# Patient Record
Sex: FEMALE | Race: WHITE | NOT HISPANIC OR LATINO | Employment: UNEMPLOYED | URBAN - METROPOLITAN AREA
[De-identification: names, ages, dates, MRNs, and addresses within clinical notes are randomized per-mention and may not be internally consistent; named-entity substitution may affect disease eponyms.]

---

## 2017-08-16 ENCOUNTER — HOSPITAL ENCOUNTER (EMERGENCY)
Facility: OTHER | Age: 47
Discharge: HOME | End: 2017-08-16
Attending: EMERGENCY MEDICINE
Payer: MEDICARE

## 2017-08-16 VITALS
BODY MASS INDEX: 26.68 KG/M2 | DIASTOLIC BLOOD PRESSURE: 55 MMHG | HEIGHT: 67 IN | WEIGHT: 170 LBS | TEMPERATURE: 98 F | RESPIRATION RATE: 18 BRPM | OXYGEN SATURATION: 98 % | SYSTOLIC BLOOD PRESSURE: 105 MMHG | HEART RATE: 92 BPM

## 2017-08-16 DIAGNOSIS — R11.2 NON-INTRACTABLE VOMITING WITH NAUSEA, UNSPECIFIED VOMITING TYPE: Primary | ICD-10-CM

## 2017-08-16 LAB
ALBUMIN SERPL BCP-MCNC: 3.6 G/DL
ALP SERPL-CCNC: 81 U/L
ALT SERPL W/O P-5'-P-CCNC: 30 U/L
ANION GAP SERPL CALC-SCNC: 11 MMOL/L
AST SERPL-CCNC: 24 U/L
BASOPHILS # BLD AUTO: 0 K/UL
BASOPHILS NFR BLD: 0 %
BILIRUB SERPL-MCNC: 0.3 MG/DL
BUN SERPL-MCNC: 16 MG/DL
CALCIUM SERPL-MCNC: 8.9 MG/DL
CHLORIDE SERPL-SCNC: 105 MMOL/L
CO2 SERPL-SCNC: 22 MMOL/L
CREAT SERPL-MCNC: 0.9 MG/DL
DIFFERENTIAL METHOD: ABNORMAL
EOSINOPHIL # BLD AUTO: 0 K/UL
EOSINOPHIL NFR BLD: 0.3 %
ERYTHROCYTE [DISTWIDTH] IN BLOOD BY AUTOMATED COUNT: 13 %
EST. GFR  (AFRICAN AMERICAN): >60 ML/MIN/1.73 M^2
EST. GFR  (NON AFRICAN AMERICAN): >60 ML/MIN/1.73 M^2
GLUCOSE SERPL-MCNC: 113 MG/DL
HCT VFR BLD AUTO: 38.6 %
HGB BLD-MCNC: 13 G/DL
LIPASE SERPL-CCNC: 21 U/L
LYMPHOCYTES # BLD AUTO: 0.5 K/UL
LYMPHOCYTES NFR BLD: 4.9 %
MCH RBC QN AUTO: 33.4 PG
MCHC RBC AUTO-ENTMCNC: 33.7 G/DL
MCV RBC AUTO: 99 FL
MONOCYTES # BLD AUTO: 0.6 K/UL
MONOCYTES NFR BLD: 6.9 %
NEUTROPHILS # BLD AUTO: 8 K/UL
NEUTROPHILS NFR BLD: 87.7 %
PLATELET # BLD AUTO: 282 K/UL
PMV BLD AUTO: 9.9 FL
POTASSIUM SERPL-SCNC: 3.6 MMOL/L
PROT SERPL-MCNC: 6.5 G/DL
RBC # BLD AUTO: 3.89 M/UL
SODIUM SERPL-SCNC: 138 MMOL/L
WBC # BLD AUTO: 9.1 K/UL

## 2017-08-16 PROCEDURE — 83690 ASSAY OF LIPASE: CPT

## 2017-08-16 PROCEDURE — 80053 COMPREHEN METABOLIC PANEL: CPT

## 2017-08-16 PROCEDURE — 85025 COMPLETE CBC W/AUTO DIFF WBC: CPT

## 2017-08-16 PROCEDURE — 99284 EMERGENCY DEPT VISIT MOD MDM: CPT | Mod: 25

## 2017-08-16 PROCEDURE — 96360 HYDRATION IV INFUSION INIT: CPT

## 2017-08-16 PROCEDURE — 25000003 PHARM REV CODE 250: Performed by: EMERGENCY MEDICINE

## 2017-08-16 RX ORDER — PANTOPRAZOLE SODIUM 40 MG/1
40 TABLET, DELAYED RELEASE ORAL 2 TIMES DAILY
COMMUNITY

## 2017-08-16 RX ORDER — METOPROLOL SUCCINATE 50 MG/1
50 TABLET, EXTENDED RELEASE ORAL DAILY
COMMUNITY

## 2017-08-16 RX ORDER — CYCLOBENZAPRINE HCL 10 MG
10 TABLET ORAL 3 TIMES DAILY PRN
COMMUNITY

## 2017-08-16 RX ORDER — TRANDOLAPRIL 1 MG/1
1 TABLET ORAL DAILY
COMMUNITY

## 2017-08-16 RX ORDER — CLONAZEPAM 1 MG/1
1 TABLET ORAL 2 TIMES DAILY PRN
COMMUNITY

## 2017-08-16 RX ORDER — CITALOPRAM 40 MG/1
40 TABLET, FILM COATED ORAL DAILY
COMMUNITY

## 2017-08-16 RX ORDER — PROMETHAZINE HYDROCHLORIDE 25 MG/1
25 TABLET ORAL EVERY 4 HOURS
COMMUNITY

## 2017-08-16 RX ORDER — CLOPIDOGREL BISULFATE 75 MG/1
75 TABLET ORAL DAILY
COMMUNITY

## 2017-08-16 RX ADMIN — SODIUM CHLORIDE 1000 ML: 0.9 INJECTION, SOLUTION INTRAVENOUS at 07:08

## 2017-08-16 NOTE — ED NOTES
Bed: [place] [address]  Expected date:   Expected time:   Means of arrival:   Comments:  43 y f n/v

## 2017-08-17 NOTE — ED TRIAGE NOTES
Pt presents to ED via EMS with c/o multiple episodes of vomiting that started today, reports unrelieved with 25 mg phenergan tablets. Pt denies n/v at this time after given 4 mg of Zofran by EMS. Pt reports she was walking outside in the heat the past few days. Pt c/o abdominal pain from multiple episode of vomiting. Pt AAO x4.

## 2017-08-17 NOTE — ED PROVIDER NOTES
Encounter Date: 8/16/2017    SCRIBE #1 NOTE: IMadiha am scribing for, and in the presence of,  Dr. Crocker. I have scribed the following portions of the note - Other sections scribed: HPI.   SCRIBE #2 NOTE: Brunilda GIBBS, gomez scribing for, and in the presence of,  Dr. Crocker. I have scribed the remaining portions of the note not scribed by Scribe #1.     History     Chief Complaint   Patient presents with    Vomiting     reports n/v and generalized abd pain since beginning vomiting 4 hrs ago. pt reports attempted to take phenergen 25 mg x 4 without success due to vomiting.     Abdominal Pain     Time seen by provider: 7:49 PM    This is a 46 y.o. 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  The patient also endorses abdominal pain described as upper abdomen soreness from repeated retching.  She admits to eating a large breakfast, beignets, and a hot dog today, then developed symptoms after spending a significant amount of time in the heat. Pt has episodic emesis from multiple complications from mesh surgery years ago, with chronic abdominal pain, no worsening now. She denies fever, chills, SOB, CP, palpitations, leg swelling, blood in stool, constipation, or diarrhea.  Although she attempted to alleviate her discomfort with phenergan, she vomited multiple attempts to take pill.  The patient reports no other identifying, alleviating, or exacerbating factors, no sick contacts or known food poisoning. She also mentions atraumatic abnormal bruising to her bilateral lower extremities, she states that this has persisted for approximately 2 weeks. She admits that she takes Plavix daily and NSAIDs PRN for chronic pain.       The history is provided by the patient.     Review of patient's allergies indicates:   Allergen Reactions    Amitriptyline      "Bactrim [sulfamethoxazole-trimethoprim]     Ciprofloxacin     Pcn [penicillins]      Past Medical History:   Diagnosis Date    Abdominal mass     HTN (hypertension)     Skin cancer     Stomach ulcer      Past Surgical History:   Procedure Laterality Date    BLADDER SURGERY      CHOLECYSTECTOMY      HYSTERECTOMY      TONSILLECTOMY       History reviewed. No pertinent family history.  Social History   Substance Use Topics    Smoking status: Former Smoker    Smokeless tobacco: Never Used    Alcohol use No     Review of Systems   Constitutional: Negative for chills and fever.   HENT: Negative for facial swelling.    Respiratory: Negative for shortness of breath.    Cardiovascular: Negative for chest pain, palpitations and leg swelling.   Gastrointestinal: Positive for abdominal pain, nausea and vomiting. Negative for blood in stool, constipation and diarrhea.   Endocrine: Negative for polyuria.   Genitourinary: Negative for dysuria.   Musculoskeletal: Negative for myalgias.   Skin: Negative for rash.        Positive for "knots" in the right posterior and left lateral calves bilaterally.   Neurological: Negative for headaches.   Hematological: Bruises/bleeds easily.       Physical Exam     Initial Vitals [08/16/17 1836]   BP Pulse Resp Temp SpO2   (!) 121/57 98 18 98 °F (36.7 °C) 97 %      MAP       78.33         Physical Exam    Nursing note and vitals reviewed.  Constitutional: She appears well-developed and well-nourished. She is not diaphoretic. No distress.   HENT:   Head: Normocephalic and atraumatic.   Right Ear: External ear normal.   Left Ear: External ear normal.   Mouth/Throat: Mucous membranes are dry.   Eyes: EOM are normal. Right eye exhibits no discharge. Left eye exhibits no discharge.   Neck: Normal range of motion.   Cardiovascular: Normal rate, regular rhythm and normal heart sounds. Exam reveals no gallop and no friction rub.    No murmur heard.  Pulmonary/Chest: Breath sounds normal. No " respiratory distress. She has no wheezes. She has no rhonchi. She has no rales.   Abdominal: Soft. There is tenderness. There is no rebound and no guarding.   Non-focal mild upper abdominal tenderness to palpation.   Musculoskeletal: Normal range of motion. She exhibits no edema or tenderness.   R calf superficial area ecchymosis, no distal edema/erythema.  Few other small areas ecchymosis over extremities with no sign underlying fracture   Neurological: She is alert and oriented to person, place, and time.   Skin: Skin is warm and dry. No rash and no abscess noted. No erythema. No pallor.   Psychiatric: She has a normal mood and affect. Her behavior is normal. Judgment and thought content normal.         ED Course   Procedures  Labs Reviewed   CBC W/ AUTO DIFFERENTIAL - Abnormal; Notable for the following:        Result Value    RBC 3.89 (*)     MCV 99 (*)     MCH 33.4 (*)     Gran # 8.0 (*)     Lymph # 0.5 (*)     Gran% 87.7 (*)     Lymph% 4.9 (*)     All other components within normal limits   COMPREHENSIVE METABOLIC PANEL - Abnormal; Notable for the following:     CO2 22 (*)     Glucose 113 (*)     All other components within normal limits   LIPASE     Imaging Results    None                 Medical Decision Making:   History:   Old Medical Records: I decided to obtain old medical records.  Initial Assessment:       46F with complications from vaginal mesh surgery causing chronic GI motility issues, presents with persistent emesis onset today. No known food poisoning, but she had a heavier than usual PO intake today and spent a lot of time in the heat prior to onset. She has chronic abdominal pain but not worse than baseline, and no sign acute abdomen on exam. Afebrile, and otherwise at baseline prior. Treated with IVF/Zofran with EMS PTA, and now no nausea and she feels better. Appears dehydrated on exam, so give additional IVF. Labs with no elevated WBC or LFTs/lipase, and pt remained afebrile with no  indication for CT. Possible gastritis, heat exhaustion, or exacerbation of her dysmotility. No sign pancreatitis, SBO, appendicitis clinically. Tolerating PO on discharge and stable for discharge with Phenergan PRN, bland diet.    Pt also noted small areas ecchymosis over extremities, largest over R calf, no known trauma but she has been taking NSAIDs PRN in addition to Plavix. Likely due to incidental trauma and Plavix use, no distal edema or erythema to suggest DVT. Pt has multiple appts with her specialists this week when she returns home tomorrow and will f/u with them, return here if worse before then.    Clinical Tests:   Lab Tests: Ordered and Reviewed            Scribe Attestation:   Scribe #1: I performed the above scribed service and the documentation accurately describes the services I performed. I attest to the accuracy of the note.  Scribe #2: I performed the above scribed service and the documentation accurately describes the services I performed. I attest to the accuracy of the note.    Attending Attestation:           Physician Attestation for Scribe:  Physician Attestation Statement for Scribe #1: I, Dr. Crocker, reviewed documentation, as scribed by Madiha Ruiz in my presence, and it is both accurate and complete.   Physician Attestation Statement for Scribe #2: I, Dr. Crocker, reviewed documentation, as scribed by Brunilda Pedersen in my presence, and it is both accurate and complete. I also acknowledge and confirm the content of the note done by Scribe #1.              ED Course     Clinical Impression:     1. Non-intractable vomiting with nausea, unspecified vomiting type                                 Reddy Crocker MD  08/17/17 1400

## 2017-08-17 NOTE — PROVIDER PROGRESS NOTES - EMERGENCY DEPT.
Encounter Date: 8/16/2017    ED Physician Progress Notes        Physician Note:   Initial evaluation on EMS stretcher. Vitals stable. TTP epigastric pain.